# Patient Record
Sex: FEMALE | Race: WHITE | Employment: OTHER | URBAN - NONMETROPOLITAN AREA
[De-identification: names, ages, dates, MRNs, and addresses within clinical notes are randomized per-mention and may not be internally consistent; named-entity substitution may affect disease eponyms.]

---

## 2021-02-06 ENCOUNTER — NURSE ONLY (OUTPATIENT)
Dept: LAB | Age: 51
End: 2021-02-06

## 2021-02-19 LAB — CYTOLOGY THIN PREP PAP: NORMAL

## 2022-03-25 ENCOUNTER — HOSPITAL ENCOUNTER (EMERGENCY)
Age: 52
Discharge: HOME | End: 2022-03-25
Attending: EMERGENCY MEDICINE
Payer: COMMERCIAL

## 2022-03-25 ENCOUNTER — APPOINTMENT (OUTPATIENT)
Dept: GENERAL RADIOLOGY | Age: 52
End: 2022-03-25
Payer: COMMERCIAL

## 2022-03-25 VITALS
OXYGEN SATURATION: 98 % | DIASTOLIC BLOOD PRESSURE: 93 MMHG | TEMPERATURE: 98.1 F | HEIGHT: 66 IN | SYSTOLIC BLOOD PRESSURE: 132 MMHG | BODY MASS INDEX: 28.28 KG/M2 | RESPIRATION RATE: 18 BRPM | HEART RATE: 69 BPM | WEIGHT: 176 LBS

## 2022-03-25 DIAGNOSIS — S60.222A CONTUSION OF LEFT HAND, INITIAL ENCOUNTER: Primary | ICD-10-CM

## 2022-03-25 PROCEDURE — 99283 EMERGENCY DEPT VISIT LOW MDM: CPT

## 2022-03-25 PROCEDURE — 73130 X-RAY EXAM OF HAND: CPT

## 2022-03-25 PROCEDURE — 29125 APPL SHORT ARM SPLINT STATIC: CPT

## 2022-03-25 RX ORDER — OMEPRAZOLE 20 MG/1
20 CAPSULE, DELAYED RELEASE ORAL DAILY
COMMUNITY

## 2022-03-25 RX ORDER — IBUPROFEN 600 MG/1
600 TABLET ORAL EVERY 6 HOURS PRN
Qty: 120 TABLET | Refills: 0 | Status: SHIPPED | OUTPATIENT
Start: 2022-03-25

## 2022-03-25 RX ORDER — LEVOTHYROXINE SODIUM 0.15 MG/1
150 TABLET ORAL DAILY
COMMUNITY

## 2022-03-25 RX ORDER — ACETAMINOPHEN 325 MG/1
650 TABLET ORAL EVERY 6 HOURS PRN
Qty: 30 TABLET | Refills: 0 | Status: SHIPPED | OUTPATIENT
Start: 2022-03-25

## 2022-03-25 NOTE — ED PROVIDER NOTES
CHIEF COMPLAINT  Chief Complaint   Patient presents with    Hand Injury     left hand smashed between wall and a drill        HPI  Abdon Willis is a 45 y.o. female with history of relative previous health who presents left hand pain and swelling overlying the scaphoid area and snuffbox after she was using a drill, the hand got caught between the drill and a piece of hard wall, was smashed. This occurred just prior to arrival and she is developed associated discoloration, swelling at this area. Pain is aching, throbbing and persistent at time of evaluation here. No associated numbness or weakness. No other injuries. No damage to the nail. REVIEW OF SYSTEMS  Review of Systems   History obtained from chart review and the patient  General ROS: negative for - fatigue    Hematological and Lymphatic ROS: negative for -blood thinner or bleeding disorder  Endocrine ROS: negative  Respiratory ROS: no cough, shortness of breath, or wheezing  Cardiovascular ROS: no chest pain or dyspnea on exertion  Gastrointestinal ROS: no abdominal pain, change in bowel habits, or black or bloody stools  Genito-Urinary ROS: negative  Musculoskeletal ROS: positive for -left hand pain and swelling  Neurological ROS: negative for - numbness/tingling or weakness      PAST MEDICAL HISTORY  Past Medical History:   Diagnosis Date    Thyroid disease        FAMILY HISTORY  History reviewed. No pertinent family history.     SOCIAL HISTORY  Social History     Socioeconomic History    Marital status: Single     Spouse name: None    Number of children: None    Years of education: None    Highest education level: None   Occupational History    None   Tobacco Use    Smoking status: Never Smoker    Smokeless tobacco: Never Used   Substance and Sexual Activity    Alcohol use: Not Currently    Drug use: Never    Sexual activity: None   Other Topics Concern    None   Social History Narrative    None     Social Determinants of Health Financial Resource Strain:     Difficulty of Paying Living Expenses: Not on file   Food Insecurity:     Worried About Running Out of Food in the Last Year: Not on file    Clarisa of Food in the Last Year: Not on file   Transportation Needs:     Lack of Transportation (Medical): Not on file    Lack of Transportation (Non-Medical): Not on file   Physical Activity:     Days of Exercise per Week: Not on file    Minutes of Exercise per Session: Not on file   Stress:     Feeling of Stress : Not on file   Social Connections:     Frequency of Communication with Friends and Family: Not on file    Frequency of Social Gatherings with Friends and Family: Not on file    Attends Restorationist Services: Not on file    Active Member of [address] [place] [address] bCommunities or Organizations: Not on file    Attends Club or Organization Meetings: Not on file    Marital Status: Not on file   Intimate Partner Violence:     Fear of Current or Ex-Partner: Not on file    Emotionally Abused: Not on file    Physically Abused: Not on file    Sexually Abused: Not on file   Housing Stability:     Unable to Pay for Housing in the Last Year: Not on file    Number of [place] in the Last Year: Not on file    Unstable Housing in the Last Year: Not on file       SURGICAL HISTORY  History reviewed. No pertinent surgical history. CURRENT MEDICATIONS  No current facility-administered medications on file prior to encounter.      Current Outpatient Medications on File Prior to Encounter   Medication Sig Dispense Refill    omeprazole (PRILOSEC) 20 MG delayed release capsule Take 20 mg by mouth daily      levothyroxine (SYNTHROID) 150 MCG tablet Take 150 mcg by mouth daily           ALLERGIES  Allergies   Allergen Reactions    Amphetamine-Dextroamphetamine     Azithromycin     Sulfa Antibiotics        PHYSICAL EXAM  VITAL SIGNS: BP (!) 132/93   Pulse 69   Temp 98.1 °F (36.7 °C) (Oral)   Resp 18   Ht 5' 6\" (1.676 m)   Wt 176 lb (79.8 kg)   SpO2 98% BMI 28.41 kg/m²   Constitutional: Well developed, Well nourished, resting in chair, active, pleasant  HENT: Normocephalic, Atraumatic, Bilateral external ears normal, ask maintained  Eyes: EOMI, Conjunctiva normal, No discharge. Neck: Normal range of motion, Supple, No stridor. Cardiovascular: Normal heart rate, Normal rhythm  Thorax & Lungs: Normal breath sounds, No respiratory distress  Abdomen: No distention noted  Skin: Warm, Dry, No erythema, No rash. Extremities: Intact distal pulses, No edema, No tenderness, No cyanosis, No clubbing. Left upper extremity: Tenderness overlying the left snuffbox with discoloration, edema and ecchymosis. Normal capillary refill of the thumb and first finger. Has range of motion but decreased secondary to pain and swelling. No tenderness at the wrist.  Musculoskeletal: Good gross range of motion in all major joints. No major deformities noted. Neurologic: Alert & oriented x 3, Normal gross motor function, Normal gross sensory function, No focal deficits noted. Psychiatric: Affect normal        RADIOLOGY/PROCEDURES/LABS  Last Imaging results   XR HAND LEFT (MIN 3 VIEWS)   Final Result   No acute osseous abnormality. Imaging reviewed by myself    COURSE & MEDICAL DECISION MAKING  Pertinent Labs & Imaging studies reviewed. (See chart for details)    54-year-old female presents with left hand contusion. Imaging without fracture or dislocation. Patient does have scaphoid area tenderness, due to concern for occult fracture she is placed in a thumb spica and referred to primary care for repeat imaging. Occult fracture precautions put into place. Her imaging here in the department does not clearly demonstrate fracture or dislocation. Recommend rice therapy, ice applied in the department. Motrin, Tylenol, rest of the area. She is agreeable with plan of care, strict return precautions put in place, discharged in neurovascularly stable condition.       FINAL IMPRESSION  Problem List Items Addressed This Visit     None      Visit Diagnoses     Contusion of left hand, initial encounter    -  Primary      1.    2.   3.    Patient gave me permission to discuss medical history, care, and plan with those present in the room.   Electronically signed by: María Brennan MD, 3/26/2022  MD María Ennis MD  03/26/22 [id number]

## 2022-03-25 NOTE — Clinical Note
Zaid Kiran was seen and treated in our emergency department on 3/25/2022. She may return to work on 03/28/2022. If you have any questions or concerns, please don't hesitate to call.       Sangita House MD no

## 2023-10-08 ENCOUNTER — HOSPITAL ENCOUNTER (EMERGENCY)
Age: 53
Discharge: HOME | End: 2023-10-08
Attending: EMERGENCY MEDICINE

## 2023-10-08 VITALS
DIASTOLIC BLOOD PRESSURE: 74 MMHG | SYSTOLIC BLOOD PRESSURE: 128 MMHG | HEIGHT: 66 IN | HEART RATE: 72 BPM | TEMPERATURE: 97.7 F | RESPIRATION RATE: 16 BRPM | BODY MASS INDEX: 26.48 KG/M2 | OXYGEN SATURATION: 96 % | WEIGHT: 164.8 LBS

## 2023-10-08 DIAGNOSIS — Z77.29 CARBON MONOXIDE EXPOSURE: Primary | ICD-10-CM

## 2023-10-08 PROCEDURE — 99283 EMERGENCY DEPT VISIT LOW MDM: CPT

## 2023-10-08 PROCEDURE — 6370000000 HC RX 637 (ALT 250 FOR IP): Performed by: EMERGENCY MEDICINE

## 2023-10-08 RX ORDER — ONDANSETRON 4 MG/1
4 TABLET, ORALLY DISINTEGRATING ORAL 3 TIMES DAILY PRN
Qty: 21 TABLET | Refills: 0 | Status: SHIPPED | OUTPATIENT
Start: 2023-10-08

## 2023-10-08 RX ORDER — ONDANSETRON 4 MG/1
4 TABLET, ORALLY DISINTEGRATING ORAL ONCE
Status: COMPLETED | OUTPATIENT
Start: 2023-10-08 | End: 2023-10-08

## 2023-10-08 RX ORDER — VITAMIN K2 90 MCG
400 CAPSULE ORAL DAILY
COMMUNITY

## 2023-10-08 RX ADMIN — ONDANSETRON 4 MG: 4 TABLET, ORALLY DISINTEGRATING ORAL at 10:44

## 2023-10-08 ASSESSMENT — PATIENT HEALTH QUESTIONNAIRE - PHQ9
SUM OF ALL RESPONSES TO PHQ9 QUESTIONS 1 & 2: 0
SUM OF ALL RESPONSES TO PHQ QUESTIONS 1-9: 0
SUM OF ALL RESPONSES TO PHQ QUESTIONS 1-9: 0
1. LITTLE INTEREST OR PLEASURE IN DOING THINGS: 0
2. FEELING DOWN, DEPRESSED OR HOPELESS: 0
SUM OF ALL RESPONSES TO PHQ QUESTIONS 1-9: 0
SUM OF ALL RESPONSES TO PHQ QUESTIONS 1-9: 0

## 2023-10-08 ASSESSMENT — LIFESTYLE VARIABLES
HOW OFTEN DO YOU HAVE A DRINK CONTAINING ALCOHOL: NEVER
HOW MANY STANDARD DRINKS CONTAINING ALCOHOL DO YOU HAVE ON A TYPICAL DAY: PATIENT DOES NOT DRINK

## 2023-10-08 ASSESSMENT — PAIN - FUNCTIONAL ASSESSMENT: PAIN_FUNCTIONAL_ASSESSMENT: NONE - DENIES PAIN

## 2023-10-08 NOTE — ED PROVIDER NOTES
Emergency Department Encounter  Location: [facility]    Patient: Valeri Martinez  MRN: [id number]  : 1970  Date of evaluation: 10/8/2023  ED Provider: Yvan Lopez DO, FACEP    Chief Complaint:    Dizziness and CO2 sensors went off this morning about [id number] (Feeling dizzy and not right since leaving the house. [place] nauseated since getting up this morning. Did have a few drinks lastnight. Felt a little disoriented.)    [place]:  Valeri Martinez is a 46 y.o. female that presents to the emergency department with complaints of dizziness and nausea. The patient states this morning she awoke to carbon monoxide detector alarming in her home. This was about 8-8 30. She awoke with a headache feeling nauseated and feeling somewhat dizzy. She felt that something was off. She states her carbon monoxide detector went off in her home about a month ago and the [address] came to fix her natural gas fired furnace. She states the heat has kicked on since it got colder last night and today she awoke with the symptoms. The carbon monoxide detector had gone off again. She got her dogs out of the house and made some arrangements and left her home. She states since she has been out of that environment she is feeling slightly better. She states she did have a few drinks last night and states that some of her symptoms may be attributed to that however the carbon monoxide detector was alarming and she took it outside it stopped alarming and started alarming when she went back inside. ROS:  At least 4 systems reviewed and otherwise acutely negative except as in the [address]   Negative for fever or chills  Negative for chest pain  Negative for shortness of breath  Positive for nausea, negative for vomiting diarrhea or constipation    Past Medical History:   Diagnosis Date    Thyroid disease      Past Surgical History:   Procedure Laterality Date    HYSTERECTOMY (CERVIX STATUS UNKNOWN)

## 2023-10-08 NOTE — DISCHARGE INSTRUCTIONS
Use Zofran as needed for nausea. Stay out of the high carbon oxide environment is much as possible. Follow-up with your primary caregiver as needed. Return to the ER for any worsening signs and symptoms.

## 2023-10-08 NOTE — ED NOTES
Discharge instructions and prescription reviewed with pt and verbalizes understanding.      Jesús Wallace RN  10/08/23 2699

## 2024-04-12 ENCOUNTER — HOSPITAL ENCOUNTER (OUTPATIENT)
Dept: MRI IMAGING | Age: 54
Discharge: HOME | End: 2024-04-12
Payer: MEDICAID

## 2024-04-12 DIAGNOSIS — M54.50 LOW BACK PAIN, UNSPECIFIED BACK PAIN LATERALITY, UNSPECIFIED CHRONICITY, UNSPECIFIED WHETHER SCIATICA PRESENT: ICD-10-CM

## 2024-04-12 PROCEDURE — 72195 MRI PELVIS W/O DYE: CPT

## 2024-04-12 PROCEDURE — 72148 MRI LUMBAR SPINE W/O DYE: CPT
